# Patient Record
Sex: MALE | Race: WHITE | NOT HISPANIC OR LATINO | Employment: OTHER | URBAN - METROPOLITAN AREA
[De-identification: names, ages, dates, MRNs, and addresses within clinical notes are randomized per-mention and may not be internally consistent; named-entity substitution may affect disease eponyms.]

---

## 2019-09-03 ENCOUNTER — NEW REFERRAL (OUTPATIENT)
Dept: URBAN - METROPOLITAN AREA CLINIC 26 | Facility: CLINIC | Age: 74
End: 2019-09-03

## 2019-09-03 VITALS
BODY MASS INDEX: 25.62 KG/M2 | SYSTOLIC BLOOD PRESSURE: 118 MMHG | HEIGHT: 69 IN | DIASTOLIC BLOOD PRESSURE: 66 MMHG | HEART RATE: 58 BPM | WEIGHT: 173 LBS

## 2019-09-03 DIAGNOSIS — H35.372: ICD-10-CM

## 2019-09-03 DIAGNOSIS — H40.1130: ICD-10-CM

## 2019-09-03 DIAGNOSIS — H35.3132: ICD-10-CM

## 2019-09-03 PROCEDURE — 92235 FLUORESCEIN ANGRPH MLTIFRAME: CPT

## 2019-09-03 PROCEDURE — 92250 FUNDUS PHOTOGRAPHY W/I&R: CPT

## 2019-09-03 PROCEDURE — 92134 CPTRZ OPH DX IMG PST SGM RTA: CPT

## 2019-09-03 PROCEDURE — 99204 OFFICE O/P NEW MOD 45 MIN: CPT

## 2019-09-03 ASSESSMENT — VISUAL ACUITY
OD_CC: 20/25
OS_SC: 20/50
OS_CC: 20/25
OD_SC: 20/50

## 2019-09-03 ASSESSMENT — TONOMETRY
OS_IOP_MMHG: 14
OD_IOP_MMHG: 12

## 2019-12-04 ENCOUNTER — FOLLOW UP (OUTPATIENT)
Dept: URBAN - METROPOLITAN AREA CLINIC 26 | Facility: CLINIC | Age: 74
End: 2019-12-04

## 2019-12-04 DIAGNOSIS — H02.834: ICD-10-CM

## 2019-12-04 DIAGNOSIS — H35.3132: ICD-10-CM

## 2019-12-04 DIAGNOSIS — H25.13: ICD-10-CM

## 2019-12-04 DIAGNOSIS — H35.372: ICD-10-CM

## 2019-12-04 DIAGNOSIS — H40.1130: ICD-10-CM

## 2019-12-04 DIAGNOSIS — H04.123: ICD-10-CM

## 2019-12-04 DIAGNOSIS — H02.831: ICD-10-CM

## 2019-12-04 PROCEDURE — 92134 CPTRZ OPH DX IMG PST SGM RTA: CPT

## 2019-12-04 PROCEDURE — 92014 COMPRE OPH EXAM EST PT 1/>: CPT

## 2019-12-04 ASSESSMENT — VISUAL ACUITY
OD_CC: 20/25-1
OS_CC: 20/30-2

## 2019-12-04 ASSESSMENT — TONOMETRY
OS_IOP_MMHG: 12
OD_IOP_MMHG: 18

## 2020-09-10 ENCOUNTER — FOLLOW UP (OUTPATIENT)
Dept: URBAN - METROPOLITAN AREA CLINIC 26 | Facility: CLINIC | Age: 75
End: 2020-09-10

## 2020-09-10 VITALS
HEIGHT: 69 IN | DIASTOLIC BLOOD PRESSURE: 67 MMHG | HEART RATE: 59 BPM | SYSTOLIC BLOOD PRESSURE: 124 MMHG | BODY MASS INDEX: 25.18 KG/M2 | WEIGHT: 170 LBS

## 2020-09-10 DIAGNOSIS — H35.3132: ICD-10-CM

## 2020-09-10 DIAGNOSIS — H35.372: ICD-10-CM

## 2020-09-10 DIAGNOSIS — H40.1130: ICD-10-CM

## 2020-09-10 PROCEDURE — 92250 FUNDUS PHOTOGRAPHY W/I&R: CPT

## 2020-09-10 PROCEDURE — 92235 FLUORESCEIN ANGRPH MLTIFRAME: CPT

## 2020-09-10 PROCEDURE — 92014 COMPRE OPH EXAM EST PT 1/>: CPT

## 2020-09-10 ASSESSMENT — TONOMETRY
OS_IOP_MMHG: 14
OD_IOP_MMHG: 16

## 2020-09-10 ASSESSMENT — VISUAL ACUITY
OS_CC: 20/25-2
OD_CC: 20/30-1

## 2021-03-11 ENCOUNTER — FOLLOW UP (OUTPATIENT)
Dept: URBAN - METROPOLITAN AREA CLINIC 26 | Facility: CLINIC | Age: 76
End: 2021-03-11

## 2021-03-11 DIAGNOSIS — H35.3132: ICD-10-CM

## 2021-03-11 DIAGNOSIS — H40.1130: ICD-10-CM

## 2021-03-11 DIAGNOSIS — H53.2: ICD-10-CM

## 2021-03-11 DIAGNOSIS — H35.372: ICD-10-CM

## 2021-03-11 PROCEDURE — 92134 CPTRZ OPH DX IMG PST SGM RTA: CPT

## 2021-03-11 PROCEDURE — 92014 COMPRE OPH EXAM EST PT 1/>: CPT

## 2021-03-11 PROCEDURE — 92235 FLUORESCEIN ANGRPH MLTIFRAME: CPT

## 2021-03-11 PROCEDURE — 92250 FUNDUS PHOTOGRAPHY W/I&R: CPT

## 2021-03-11 ASSESSMENT — VISUAL ACUITY
OD_CC: 20/30
OS_CC: 20/20

## 2021-03-11 ASSESSMENT — TONOMETRY
OS_IOP_MMHG: 14
OD_IOP_MMHG: 13

## 2021-05-07 NOTE — PATIENT DISCUSSION
COUNSELING: Albendazole Pregnancy And Lactation Text: This medication is Pregnancy Category C and it isn't known if it is safe during pregnancy. It is also excreted in breast milk.

## 2021-09-14 ENCOUNTER — FOLLOW UP (OUTPATIENT)
Dept: URBAN - METROPOLITAN AREA CLINIC 26 | Facility: CLINIC | Age: 76
End: 2021-09-14

## 2021-09-14 VITALS
HEART RATE: 56 BPM | SYSTOLIC BLOOD PRESSURE: 119 MMHG | BODY MASS INDEX: 25.62 KG/M2 | DIASTOLIC BLOOD PRESSURE: 67 MMHG | WEIGHT: 173 LBS | HEIGHT: 69 IN

## 2021-09-14 DIAGNOSIS — H53.2: ICD-10-CM

## 2021-09-14 DIAGNOSIS — H35.372: ICD-10-CM

## 2021-09-14 DIAGNOSIS — H04.123: ICD-10-CM

## 2021-09-14 DIAGNOSIS — H40.1130: ICD-10-CM

## 2021-09-14 DIAGNOSIS — H35.3132: ICD-10-CM

## 2021-09-14 PROCEDURE — 92014 COMPRE OPH EXAM EST PT 1/>: CPT

## 2021-09-14 PROCEDURE — 92235 FLUORESCEIN ANGRPH MLTIFRAME: CPT

## 2021-09-14 PROCEDURE — 92250 FUNDUS PHOTOGRAPHY W/I&R: CPT

## 2021-09-14 PROCEDURE — 92134 CPTRZ OPH DX IMG PST SGM RTA: CPT

## 2021-09-14 ASSESSMENT — VISUAL ACUITY
OS_SC: 20/30-1
OD_SC: 20/25+1

## 2021-09-14 ASSESSMENT — TONOMETRY
OD_IOP_MMHG: 12
OS_IOP_MMHG: 14

## 2022-03-14 ENCOUNTER — COMPREHENSIVE EXAM (OUTPATIENT)
Dept: URBAN - METROPOLITAN AREA CLINIC 26 | Facility: CLINIC | Age: 77
End: 2022-03-14

## 2022-03-14 VITALS — DIASTOLIC BLOOD PRESSURE: 70 MMHG | HEIGHT: 60 IN | HEART RATE: 70 BPM | SYSTOLIC BLOOD PRESSURE: 134 MMHG

## 2022-03-14 DIAGNOSIS — H35.3132: ICD-10-CM

## 2022-03-14 DIAGNOSIS — H04.123: ICD-10-CM

## 2022-03-14 DIAGNOSIS — H40.1130: ICD-10-CM

## 2022-03-14 DIAGNOSIS — H35.372: ICD-10-CM

## 2022-03-14 PROCEDURE — 92134 CPTRZ OPH DX IMG PST SGM RTA: CPT

## 2022-03-14 PROCEDURE — 92235 FLUORESCEIN ANGRPH MLTIFRAME: CPT

## 2022-03-14 PROCEDURE — 92014 COMPRE OPH EXAM EST PT 1/>: CPT

## 2022-03-14 PROCEDURE — 92250 FUNDUS PHOTOGRAPHY W/I&R: CPT

## 2022-03-14 ASSESSMENT — VISUAL ACUITY
OS_SC: 20/80
OS_PH: 20/40+1
OD_SC: 20/25+2

## 2022-03-14 ASSESSMENT — TONOMETRY
OS_IOP_MMHG: 12
OD_IOP_MMHG: 11

## 2022-09-14 ENCOUNTER — COMPREHENSIVE EXAM (OUTPATIENT)
Dept: URBAN - METROPOLITAN AREA CLINIC 26 | Facility: CLINIC | Age: 77
End: 2022-09-14

## 2022-09-14 VITALS
DIASTOLIC BLOOD PRESSURE: 64 MMHG | WEIGHT: 165 LBS | SYSTOLIC BLOOD PRESSURE: 120 MMHG | HEIGHT: 69 IN | HEART RATE: 54 BPM | BODY MASS INDEX: 24.44 KG/M2

## 2022-09-14 DIAGNOSIS — H35.372: ICD-10-CM

## 2022-09-14 DIAGNOSIS — H04.123: ICD-10-CM

## 2022-09-14 DIAGNOSIS — H40.1130: ICD-10-CM

## 2022-09-14 DIAGNOSIS — H35.3132: ICD-10-CM

## 2022-09-14 PROCEDURE — 92134 CPTRZ OPH DX IMG PST SGM RTA: CPT

## 2022-09-14 PROCEDURE — 92235 FLUORESCEIN ANGRPH MLTIFRAME: CPT

## 2022-09-14 PROCEDURE — 92014 COMPRE OPH EXAM EST PT 1/>: CPT

## 2022-09-14 ASSESSMENT — TONOMETRY
OD_IOP_MMHG: 12
OS_IOP_MMHG: 11

## 2022-09-14 ASSESSMENT — VISUAL ACUITY
OS_SC: 20/30-1
OD_SC: 20/20-2

## 2023-04-05 ENCOUNTER — COMPREHENSIVE EXAM (OUTPATIENT)
Dept: URBAN - METROPOLITAN AREA CLINIC 26 | Facility: CLINIC | Age: 78
End: 2023-04-05

## 2023-04-05 VITALS — HEART RATE: 56 BPM | DIASTOLIC BLOOD PRESSURE: 64 MMHG | SYSTOLIC BLOOD PRESSURE: 114 MMHG | HEIGHT: 60 IN

## 2023-04-05 DIAGNOSIS — H35.372: ICD-10-CM

## 2023-04-05 DIAGNOSIS — H40.1130: ICD-10-CM

## 2023-04-05 DIAGNOSIS — H04.123: ICD-10-CM

## 2023-04-05 DIAGNOSIS — H18.513: ICD-10-CM

## 2023-04-05 DIAGNOSIS — H35.3132: ICD-10-CM

## 2023-04-05 PROCEDURE — 92250 FUNDUS PHOTOGRAPHY W/I&R: CPT

## 2023-04-05 PROCEDURE — 92014 COMPRE OPH EXAM EST PT 1/>: CPT

## 2023-04-05 PROCEDURE — 92134 CPTRZ OPH DX IMG PST SGM RTA: CPT

## 2023-04-05 PROCEDURE — 92235 FLUORESCEIN ANGRPH MLTIFRAME: CPT

## 2023-04-05 ASSESSMENT — TONOMETRY
OS_IOP_MMHG: 12
OD_IOP_MMHG: 11

## 2023-04-05 ASSESSMENT — VISUAL ACUITY
OD_SC: 20/25-1
OS_SC: 20/40

## 2023-10-04 ENCOUNTER — COMPREHENSIVE EXAM (OUTPATIENT)
Dept: URBAN - METROPOLITAN AREA CLINIC 26 | Facility: CLINIC | Age: 78
End: 2023-10-04

## 2023-10-04 VITALS
WEIGHT: 165 LBS | DIASTOLIC BLOOD PRESSURE: 71 MMHG | HEART RATE: 62 BPM | BODY MASS INDEX: 24.44 KG/M2 | HEIGHT: 69 IN | SYSTOLIC BLOOD PRESSURE: 111 MMHG

## 2023-10-04 DIAGNOSIS — H35.372: ICD-10-CM

## 2023-10-04 DIAGNOSIS — H35.3132: ICD-10-CM

## 2023-10-04 DIAGNOSIS — H40.1130: ICD-10-CM

## 2023-10-04 DIAGNOSIS — H04.123: ICD-10-CM

## 2023-10-04 PROCEDURE — 92250 FUNDUS PHOTOGRAPHY W/I&R: CPT | Mod: 59

## 2023-10-04 PROCEDURE — 92014 COMPRE OPH EXAM EST PT 1/>: CPT

## 2023-10-04 PROCEDURE — 92134 CPTRZ OPH DX IMG PST SGM RTA: CPT

## 2023-10-04 PROCEDURE — 92235 FLUORESCEIN ANGRPH MLTIFRAME: CPT

## 2023-10-04 ASSESSMENT — TONOMETRY
OS_IOP_MMHG: 14
OD_IOP_MMHG: 16

## 2023-10-04 ASSESSMENT — VISUAL ACUITY
OD_SC: 20/25+2
OS_SC: 20/40-2

## 2024-04-09 ENCOUNTER — COMPREHENSIVE EXAM (OUTPATIENT)
Dept: URBAN - METROPOLITAN AREA CLINIC 26 | Facility: CLINIC | Age: 79
End: 2024-04-09

## 2024-04-09 VITALS — WEIGHT: 163 LBS | BODY MASS INDEX: 24.14 KG/M2 | HEIGHT: 69 IN

## 2024-04-09 DIAGNOSIS — H02.831: ICD-10-CM

## 2024-04-09 DIAGNOSIS — H35.3132: ICD-10-CM

## 2024-04-09 DIAGNOSIS — H02.834: ICD-10-CM

## 2024-04-09 DIAGNOSIS — H04.123: ICD-10-CM

## 2024-04-09 DIAGNOSIS — H40.1130: ICD-10-CM

## 2024-04-09 DIAGNOSIS — H18.513: ICD-10-CM

## 2024-04-09 DIAGNOSIS — H35.372: ICD-10-CM

## 2024-04-09 PROCEDURE — 99199MRDT MACULAR RISK (DNA TESTING)

## 2024-04-09 PROCEDURE — 92235 FLUORESCEIN ANGRPH MLTIFRAME: CPT

## 2024-04-09 PROCEDURE — 92134 CPTRZ OPH DX IMG PST SGM RTA: CPT

## 2024-04-09 PROCEDURE — 92014 COMPRE OPH EXAM EST PT 1/>: CPT

## 2024-04-09 ASSESSMENT — TONOMETRY
OD_IOP_MMHG: 14
OS_IOP_MMHG: 13

## 2024-04-09 ASSESSMENT — VISUAL ACUITY
OD_SC: 20/20-2
OS_SC: 20/25

## 2024-10-14 ENCOUNTER — COMPREHENSIVE EXAM (OUTPATIENT)
Dept: URBAN - METROPOLITAN AREA CLINIC 26 | Facility: CLINIC | Age: 79
End: 2024-10-14

## 2024-10-14 VITALS
WEIGHT: 140 LBS | SYSTOLIC BLOOD PRESSURE: 108 MMHG | HEIGHT: 69 IN | BODY MASS INDEX: 20.73 KG/M2 | DIASTOLIC BLOOD PRESSURE: 61 MMHG | HEART RATE: 63 BPM

## 2024-10-14 DIAGNOSIS — H18.513: ICD-10-CM

## 2024-10-14 DIAGNOSIS — H02.831: ICD-10-CM

## 2024-10-14 DIAGNOSIS — H35.372: ICD-10-CM

## 2024-10-14 DIAGNOSIS — H04.123: ICD-10-CM

## 2024-10-14 DIAGNOSIS — H02.834: ICD-10-CM

## 2024-10-14 DIAGNOSIS — H40.1130: ICD-10-CM

## 2024-10-14 DIAGNOSIS — H35.3132: ICD-10-CM

## 2024-10-14 PROCEDURE — 92235 FLUORESCEIN ANGRPH MLTIFRAME: CPT

## 2024-10-14 PROCEDURE — 92134 CPTRZ OPH DX IMG PST SGM RTA: CPT

## 2024-10-14 PROCEDURE — 92014 COMPRE OPH EXAM EST PT 1/>: CPT

## 2024-10-14 PROCEDURE — 92250 FUNDUS PHOTOGRAPHY W/I&R: CPT | Mod: 59

## 2025-04-14 ENCOUNTER — COMPREHENSIVE EXAM (OUTPATIENT)
Age: 80
End: 2025-04-14

## 2025-04-14 VITALS
SYSTOLIC BLOOD PRESSURE: 145 MMHG | HEART RATE: 58 BPM | HEIGHT: 69 IN | WEIGHT: 140 LBS | DIASTOLIC BLOOD PRESSURE: 76 MMHG | BODY MASS INDEX: 20.73 KG/M2

## 2025-04-14 DIAGNOSIS — H35.3132: ICD-10-CM

## 2025-04-14 DIAGNOSIS — H02.831: ICD-10-CM

## 2025-04-14 DIAGNOSIS — H35.372: ICD-10-CM

## 2025-04-14 DIAGNOSIS — H40.1130: ICD-10-CM

## 2025-04-14 DIAGNOSIS — H02.834: ICD-10-CM

## 2025-04-14 DIAGNOSIS — H18.513: ICD-10-CM

## 2025-04-14 DIAGNOSIS — H04.123: ICD-10-CM

## 2025-04-14 PROCEDURE — 92235 FLUORESCEIN ANGRPH MLTIFRAME: CPT

## 2025-04-14 PROCEDURE — 92250 FUNDUS PHOTOGRAPHY W/I&R: CPT | Mod: 59

## 2025-04-14 PROCEDURE — 92134 CPTRZ OPH DX IMG PST SGM RTA: CPT

## 2025-04-14 PROCEDURE — 92014 COMPRE OPH EXAM EST PT 1/>: CPT
